# Patient Record
Sex: MALE | Race: WHITE | NOT HISPANIC OR LATINO | URBAN - METROPOLITAN AREA
[De-identification: names, ages, dates, MRNs, and addresses within clinical notes are randomized per-mention and may not be internally consistent; named-entity substitution may affect disease eponyms.]

---

## 2017-12-20 NOTE — PATIENT DISCUSSION
DIABETES WITHOUT RETINOPATHY: THE RETINOPATHY THAT PATIENT IS BLOOD PRESSURE RELATED. CONTINUE TO FOLLOW PATIENT YEARLY FOR DIABETES.

## 2017-12-20 NOTE — PATIENT DISCUSSION
HYPERTENSIVE RETINOPATHY OU : DISCOVERED ON TODAY'S EXAM. EXPLAINED RISK FACTORS OF HIGH BLOOD PRESSURE WHICH INCLUDE STROKE IN GREAT DETAIL. REC PT CONTINUE TO MONITOR B/P CLOSELY. PATIENT TO SEE HER PRIMARY CARE DOCTOR WITHIN THE NEXT 50 HOURS AND A RETINAL SPECIALIST WITHIN THE NEXT 7 DAYS.

## 2017-12-20 NOTE — PATIENT DISCUSSION
CHOROIDAL NEVI, (x2)  OS: PRESCRIBED UV PROTECTION TO MINIMIZE UV EXPOSURE. RETURN FOR FOLLOW-UP AS SCHEDULED.

## 2017-12-27 NOTE — PATIENT DISCUSSION
CHOROIDAL NEVUS, OS: MACULAR NEVI x2 OS. STABLE. PRESCRIBED UV PROTECTION TO MINIMIZE UV EXPOSURE. RETURN AS SCHEDULED FOR EVALUATION AND PHOTO DOCUMENTATION.

## 2018-12-10 NOTE — PATIENT DISCUSSION
Epiretinal Membrane Counseling: The diagnosis and natural history of epiretinal membrane was discussed with the patient including the risk of progression with retinal traction resulting in visual distortion. Patient instructed on how to do [address] to check for distortion in vision, The patient is instructed to call back immediately if any vision changes, and keep follow up as scheduled.

## 2018-12-10 NOTE — PATIENT DISCUSSION
CHOROIDAL NEVI (x2) OS:  PRESCRIBED UV PROTECTION TO MINIMIZE UV EXPOSURE. RETURN FOR FOLLOW-UP AS SCHEDULED.

## 2018-12-10 NOTE — PATIENT DISCUSSION
NONPROLIFERATIVE DIABETIC RETINOPATHY, OU: RETURN FOR FOLLOW-UP AS SCHEDULED FOR DILATED FUNDUS EXAM WITH DR. Reanna Riley.

## 2019-08-14 NOTE — PATIENT DISCUSSION
Home Health SOC 10/16/2018 - 12/14/2018 with [facility] ([place] - Dr. Obed Mcguire. Patient received SN and PT services.   Posterior Vitreous Detachment: I have discussed the diagnosis of PVD with the patient and the possibility of a retinal tear or detachment. The signs/symptoms of a retinal tear/detachment were reviewed to include but not limited to redness, discharge, pain, increase or change in flashes of light, increase or change in floaters, decreased vision, part of the vision missing, veils or any other ocular concerns. I have further explained not all patients who develop a tear or detachment have notable symptoms, therefore, compliance with return visits are necessary. The patient was instructed to contact us immediately if they noticed any of the signs or symptoms of retinal detachment as noted above as the prognosis for any potential treatment options may be time related. Return for follow-up as scheduled.

## 2019-12-11 NOTE — PATIENT DISCUSSION
Epiretinal Membrane Counseling: The diagnosis and natural history of epiretinal membrane was discussed with the patient including the risk of progression with retinal traction resulting in visual distortion. Patient instructed on how to do [address] to check for distortion in vision, The patient is instructed to call back immediately if any vision changes, and keep follow up as scheduled.

## 2019-12-11 NOTE — PATIENT DISCUSSION
HYPERTENSIVE RETINOPATHY OU :   REC PT CONTINUE TO MONITOR B/P CLOSELY. CONTINUE WITH APPOINTMENTS AS SCHEDULED.

## 2019-12-11 NOTE — PATIENT DISCUSSION
NONPROLIFERATIVE DIABETIC RETINOPATHY, OU: RETURN FOR FOLLOW-UP AS SCHEDULED FOR DILATED FUNDUS EXAM. RETURN TO DR. Javier Waddell, RETINAL SPECIALIST, AS SCHEDULED.

## 2020-03-26 ENCOUNTER — IMPORTED ENCOUNTER (OUTPATIENT)
Dept: URBAN - METROPOLITAN AREA CLINIC 50 | Facility: CLINIC | Age: 50
End: 2020-03-26

## 2020-03-27 ENCOUNTER — IMPORTED ENCOUNTER (OUTPATIENT)
Dept: URBAN - METROPOLITAN AREA CLINIC 50 | Facility: CLINIC | Age: 50
End: 2020-03-27

## 2020-03-30 ENCOUNTER — IMPORTED ENCOUNTER (OUTPATIENT)
Dept: URBAN - METROPOLITAN AREA CLINIC 50 | Facility: CLINIC | Age: 50
End: 2020-03-30

## 2020-12-14 NOTE — PATIENT DISCUSSION
POSTERIOR VITREOUS DETACHMENT WITH VITREOUS FLOATERS, OD: NO HOLES OR NO TEARS 360' WITH INDIRECT EXAM, OU. F&amp;F PRECAUTIONS REVIEWED WITH THE PATIENT. RETURN AS SCHEDULED.

## 2020-12-14 NOTE — PATIENT DISCUSSION
CHOROIDAL NEVUS, OS: STABLE. PRESCRIBED UV PROTECTION TO MINIMIZE UV EXPOSURE. RETURN FOR FOLLOW-UP AS SCHEDULED.

## 2020-12-14 NOTE — PATIENT DISCUSSION
Epiretinal Membrane Counseling: The diagnosis and natural history of epiretinal membrane was discussed with the patient including the risk of progression with retinal traction resulting in visual distortion. Patient instructed on how to do [address] to check for distortion in vision, The patient is instructed to call back immediately if any vision changes, and keep follow up as scheduled.

## 2020-12-14 NOTE — PATIENT DISCUSSION
EPIRETINAL MEMBRANE, OS : STABLE WITH MINIMAL VISUAL SIGNIFICANCE TO THE PATIENT AT THIS TIME. FOLLOW.

## 2020-12-14 NOTE — PATIENT DISCUSSION
HYPERTENSIVE RETINOPATHY OU: STABLE. REC PT CONTINUE TO MONITOR B/P CLOSELY. CONTINUE WITH APPOINTMENTS AS SCHEDULED.

## 2020-12-14 NOTE — PATIENT DISCUSSION
NONPROLIFERATIVE DIABETIC RETINOPATHY WITHOUT MACULAR EDEMA, OU: RETURN FOR FOLLOW-UP AS SCHEDULED FOR DILATED FUNDUS EXAM. FOLLOW-UP WITH DR. Penni Fabry AS DIRECTED.

## 2021-05-14 ASSESSMENT — VISUAL ACUITY
OD_CC: 20/25-2
OD_CC: J1+
OS_CC: J1+
OS_CC: 20/25-1

## 2021-05-14 ASSESSMENT — TONOMETRY
OS_IOP_MMHG: 16
OD_IOP_MMHG: 16